# Patient Record
Sex: FEMALE | Race: BLACK OR AFRICAN AMERICAN | NOT HISPANIC OR LATINO | Employment: OTHER | URBAN - METROPOLITAN AREA
[De-identification: names, ages, dates, MRNs, and addresses within clinical notes are randomized per-mention and may not be internally consistent; named-entity substitution may affect disease eponyms.]

---

## 2022-08-20 ENCOUNTER — HOSPITAL ENCOUNTER (EMERGENCY)
Facility: HOSPITAL | Age: 77
Discharge: HOME | End: 2022-08-21
Attending: INTERNAL MEDICINE
Payer: MEDICARE

## 2022-08-20 DIAGNOSIS — R00.2 HEART PALPITATIONS: ICD-10-CM

## 2022-08-20 DIAGNOSIS — R00.2 PALPITATIONS: ICD-10-CM

## 2022-08-20 DIAGNOSIS — R94.6 ABNORMAL THYROID FUNCTION TEST: ICD-10-CM

## 2022-08-20 DIAGNOSIS — I10 PRIMARY HYPERTENSION: Primary | ICD-10-CM

## 2022-08-20 DIAGNOSIS — R00.2 PALPITATION: ICD-10-CM

## 2022-08-20 LAB
ALBUMIN SERPL-MCNC: 4.1 GM/DL (ref 3.4–4.8)
ALBUMIN/GLOB SERPL: 1.1 RATIO (ref 1.1–2)
ALP SERPL-CCNC: 66 UNIT/L (ref 40–150)
ALT SERPL-CCNC: 16 UNIT/L (ref 0–55)
AST SERPL-CCNC: 18 UNIT/L (ref 5–34)
BILIRUBIN DIRECT+TOT PNL SERPL-MCNC: 1.4 MG/DL
BUN SERPL-MCNC: 11 MG/DL (ref 9.8–20.1)
CALCIUM SERPL-MCNC: 10 MG/DL (ref 8.4–10.2)
CHLORIDE SERPL-SCNC: 104 MMOL/L (ref 98–107)
CO2 SERPL-SCNC: 25 MMOL/L (ref 23–31)
CREAT SERPL-MCNC: 0.92 MG/DL (ref 0.55–1.02)
GFR SERPLBLD CREATININE-BSD FMLA CKD-EPI: >60 MLS/MIN/1.73/M2
GLOBULIN SER-MCNC: 3.9 GM/DL (ref 2.4–3.5)
GLUCOSE SERPL-MCNC: 90 MG/DL (ref 82–115)
POTASSIUM SERPL-SCNC: 3.5 MMOL/L (ref 3.5–5.1)
PROT SERPL-MCNC: 8 GM/DL (ref 5.8–7.6)
SODIUM SERPL-SCNC: 141 MMOL/L (ref 136–145)
T4 FREE SERPL-MCNC: 0.85 NG/DL (ref 0.7–1.48)
TSH SERPL-ACNC: 13.72 UIU/ML (ref 0.35–4.94)

## 2022-08-20 PROCEDURE — 84075 ASSAY ALKALINE PHOSPHATASE: CPT | Performed by: INTERNAL MEDICINE

## 2022-08-20 PROCEDURE — 93005 ELECTROCARDIOGRAM TRACING: CPT

## 2022-08-20 PROCEDURE — 84443 ASSAY THYROID STIM HORMONE: CPT | Performed by: INTERNAL MEDICINE

## 2022-08-20 PROCEDURE — 36415 COLL VENOUS BLD VENIPUNCTURE: CPT | Performed by: INTERNAL MEDICINE

## 2022-08-20 PROCEDURE — 84439 ASSAY OF FREE THYROXINE: CPT | Performed by: INTERNAL MEDICINE

## 2022-08-20 PROCEDURE — 80053 COMPREHEN METABOLIC PANEL: CPT | Performed by: INTERNAL MEDICINE

## 2022-08-20 PROCEDURE — 99285 EMERGENCY DEPT VISIT HI MDM: CPT | Mod: 25

## 2022-08-21 VITALS
DIASTOLIC BLOOD PRESSURE: 78 MMHG | OXYGEN SATURATION: 100 % | BODY MASS INDEX: 25.73 KG/M2 | SYSTOLIC BLOOD PRESSURE: 174 MMHG | TEMPERATURE: 99 F | HEIGHT: 67 IN | WEIGHT: 163.94 LBS | HEART RATE: 75 BPM | RESPIRATION RATE: 18 BRPM

## 2022-08-21 NOTE — ED PROVIDER NOTES
"Encounter Date: 8/20/2022       History     Chief Complaint   Patient presents with    Palpitations     Pt arrives with c/o "feeling like my heart is quivering" x a few weeks; denies any pain      Presents with heart palpitations, states intermittently usually at night. No medical problems, not taking any medications. Denies other symptoms    The history is provided by the patient.   Palpitations   This is a recurrent problem. The current episode started several weeks ago. The problem occurs intermittently. The problem has been waxing and waning. Pertinent negatives include no diaphoresis, no fever, no chest pain, no chest pressure, no near-syncope, no orthopnea, no syncope, no abdominal pain, no nausea, no back pain, no lower extremity edema, no weakness, no hemoptysis and no shortness of breath. She has tried nothing for the symptoms. Risk factors include post menopause. Her past medical history does not include anemia, heart disease, hyperthyroidism or valve disorder.     Review of patient's allergies indicates:  No Known Allergies  History reviewed. No pertinent past medical history.  No past surgical history on file.  History reviewed. No pertinent family history.     Review of Systems   Constitutional: Negative for diaphoresis and fever.   HENT: Negative for sore throat.    Respiratory: Negative for hemoptysis and shortness of breath.    Cardiovascular: Positive for palpitations. Negative for chest pain, orthopnea, syncope and near-syncope.   Gastrointestinal: Negative for abdominal pain and nausea.   Genitourinary: Negative for dysuria.   Musculoskeletal: Negative for back pain.   Skin: Negative for rash.   Neurological: Negative for weakness.   Hematological: Does not bruise/bleed easily.   All other systems reviewed and are negative.      Physical Exam     Initial Vitals [08/20/22 2014]   BP Pulse Resp Temp SpO2   (!) 189/87 106 18 98.3 °F (36.8 °C) 99 %      MAP       --         Physical Exam    Nursing " note and vitals reviewed.  Constitutional: She appears well-developed and well-nourished. No distress.   HENT:   Head: Normocephalic and atraumatic.   Mouth/Throat: Oropharynx is clear and moist.   Eyes: Conjunctivae are normal. Pupils are equal, round, and reactive to light.   Neck: Neck supple.   Normal range of motion.  Cardiovascular: Normal rate, regular rhythm, normal heart sounds and intact distal pulses.   Pulmonary/Chest: Breath sounds normal.   Abdominal: Abdomen is soft. Bowel sounds are normal. She exhibits no distension. There is no abdominal tenderness. There is no rebound and no guarding.   Musculoskeletal:         General: No edema. Normal range of motion.      Cervical back: Normal range of motion and neck supple.     Neurological: She is alert and oriented to person, place, and time. She has normal strength. GCS score is 15. GCS eye subscore is 4. GCS verbal subscore is 5. GCS motor subscore is 6.   Skin: Skin is warm and dry. No rash noted.   Psychiatric: Her behavior is normal.         ED Course   Procedures  Labs Reviewed   COMPREHENSIVE METABOLIC PANEL - Abnormal; Notable for the following components:       Result Value    Protein Total 8.0 (*)     Globulin 3.9 (*)     All other components within normal limits   TSH - Abnormal; Notable for the following components:    Thyroid Stimulating Hormone 13.7211 (*)     All other components within normal limits   T4, FREE - Normal   EXTRA TUBES    Narrative:     The following orders were created for panel order EXTRA TUBES.  Procedure                               Abnormality         Status                     ---------                               -----------         ------                     Light Blue Top Hold[[id number]]                                                         Red Top Hold[[id number]]                                                                Lavender Top Hold[[id number]]                                                            Pink Top Hold[[id number]]                                                                 Please view results for these tests on the individual orders.   LIGHT BLUE TOP HOLD   RED TOP HOLD   LAVENDER TOP HOLD   PINK TOP HOLD     EKG Readings: (Independently Interpreted)   Initial Reading: No STEMI. Rhythm: Normal Sinus Rhythm. Heart Rate: 80. Ectopy: No Ectopy. Conduction: Normal. ST Segments: Normal ST Segments. T Waves: Normal. Clinical Impression: Normal Sinus Rhythm       Imaging Results          X-Ray Chest PA And Lateral (In process)               X-Rays:   Independently Interpreted Readings:   Chest X-Ray: Normal heart size.  No infiltrates.  No acute abnormalities. Chronic left elevation of hemidiaphragm     Medications - No data to display                Pt states she check her BP at home often with normal results. Will like to monitor it before medications.       Clinical Impression:   Final diagnoses:  [R00.2] Palpitations  [R00.2] Palpitation  [I10] Primary hypertension (Primary)  [R00.2] Heart palpitations  [R94.6] Abnormal thyroid function test          ED Disposition Condition    Discharge Stable        ED Prescriptions     None        Follow-up Information    None          Rohan Meyer MD  08/20/22 2125

## 2022-10-03 DIAGNOSIS — H26.9 CATARACT, UNSPECIFIED CATARACT TYPE, UNSPECIFIED LATERALITY: Primary | ICD-10-CM

## 2022-12-13 ENCOUNTER — OFFICE VISIT (OUTPATIENT)
Dept: OPHTHALMOLOGY | Facility: CLINIC | Age: 77
End: 2022-12-13
Payer: MEDICARE

## 2022-12-13 VITALS — WEIGHT: 163.81 LBS | HEIGHT: 67 IN | BODY MASS INDEX: 25.71 KG/M2

## 2022-12-13 DIAGNOSIS — H43.12 VITREOUS HEMORRHAGE OF LEFT EYE: Primary | ICD-10-CM

## 2022-12-13 DIAGNOSIS — H25.812 COMBINED FORMS OF AGE-RELATED CATARACT OF LEFT EYE: ICD-10-CM

## 2022-12-13 PROCEDURE — 99212 OFFICE O/P EST SF 10 MIN: CPT | Mod: PBBFAC,PO | Performed by: STUDENT IN AN ORGANIZED HEALTH CARE EDUCATION/TRAINING PROGRAM

## 2022-12-13 PROCEDURE — 92134 CPTRZ OPH DX IMG PST SGM RTA: CPT | Mod: PBBFAC,PO | Performed by: STUDENT IN AN ORGANIZED HEALTH CARE EDUCATION/TRAINING PROGRAM

## 2022-12-13 PROCEDURE — 92136 OPHTHALMIC BIOMETRY: CPT | Mod: PBBFAC,PO | Performed by: STUDENT IN AN ORGANIZED HEALTH CARE EDUCATION/TRAINING PROGRAM

## 2022-12-13 PROCEDURE — 92134 CPTRZ OPH DX IMG PST SGM RTA: CPT | Mod: PBBFAC,PO | Performed by: OPHTHALMOLOGY

## 2022-12-13 PROCEDURE — 92136 OPHTHALMIC BIOMETRY: CPT | Mod: PBBFAC,PO | Performed by: OPHTHALMOLOGY

## 2022-12-13 RX ORDER — B1/B2/B3/B5/B6/IRON/METH/CHOLN 2.5-18/15
15 LIQUID (ML) ORAL
COMMUNITY

## 2022-12-13 NOTE — PROGRESS NOTES
HPI     Cataract     Additional comments: Mac Deg OS           Comments    Referred for cataract evaluation by Dr. Mayes . Patient has a hx of   Neovascular AMD w/ inactive CNV OS          Last edited by Roya Cardoza MA on 12/13/2022  8:24 AM.            OCT Mac   RE good foveal contour no edema   LE unable     Assessment /Plan     For exam results, see Encounter Report.    Combined forms of age-related cataract of left eye  -     Ambulatory referral/consult to Ophthalmology      Combined form of age-related cataract, left eye  - Patient with visually significant cataract and desires surgical removal. The risks/benefits/alternatives of cataract extraction with placement of an intraocular lens were discussed with the patient, including, but not limited to infection, bleeding, loss of vision, loss of the eye, overcorrection, undercorrection, need for further surgery. After the opportunity to ask questions, the patient elected to proceed. A consent document was signed, witnessed, and placed in the patient's chart.  - No improvement with Mrx   - Trauma: none   - Guttae: none  - Phaco/iridodonesis: none  - Trypan blue: no  - Flomax use: no  - Dilation: good  - Anticoagulant/antiplatelet use: none  - Cooperative with exam: yes Pt able to lie flat for 30 minutes, will plan to do under local  - Comorbidities: none  - Medical clearance: per anesthesia   - Patient sent by retina provider for CEIOL. Currently treated with q4wk anti-VEGF for neovascular AMD. Discussed that there is a high likelihood that surgery will not improve vision but will improve the view to the retina for further treatment of retina condition.   - Lens to be used: +22.50 [id number]   - 12/14/22 - Discussed patient with staff member at Dr. Boogie's office who confirmed that the vitreous hemorrhage was present at her last visit and that cataract extraction is requested in order to improve the view for further treatment of her retinal condition. Called  the patient to discuss and will plan for CEIOL OS on 12/20/22 with Dr. Pierce         Neovascular AMD OS   - follows with outside retina receiving q4wk Anti-VEGF             RTC postop

## 2022-12-14 PROBLEM — H43.12 VITREOUS HEMORRHAGE OF LEFT EYE: Status: ACTIVE | Noted: 2022-12-14

## 2022-12-14 PROBLEM — H25.812 COMBINED FORMS OF AGE-RELATED CATARACT OF LEFT EYE: Status: ACTIVE | Noted: 2022-12-14

## 2022-12-14 RX ORDER — PHENYLEPHRINE HYDROCHLORIDE 25 MG/ML
1 SOLUTION/ DROPS OPHTHALMIC
Status: CANCELLED | OUTPATIENT
Start: 2022-12-20

## 2022-12-14 RX ORDER — SODIUM CHLORIDE 0.9 % (FLUSH) 0.9 %
10 SYRINGE (ML) INJECTION
Status: SHIPPED | OUTPATIENT
Start: 2022-12-14

## 2022-12-14 RX ORDER — TROPICAMIDE 10 MG/ML
1 SOLUTION/ DROPS OPHTHALMIC
Status: CANCELLED | OUTPATIENT
Start: 2022-12-20

## 2022-12-14 RX ORDER — PROPARACAINE HYDROCHLORIDE 5 MG/ML
1 SOLUTION/ DROPS OPHTHALMIC
Status: CANCELLED | OUTPATIENT
Start: 2022-12-20

## 2022-12-14 RX ORDER — CYCLOPENTOLATE HYDROCHLORIDE 10 MG/ML
1 SOLUTION/ DROPS OPHTHALMIC
Status: CANCELLED | OUTPATIENT
Start: 2022-12-20

## 2022-12-19 ENCOUNTER — TELEPHONE (OUTPATIENT)
Dept: OPHTHALMOLOGY | Facility: CLINIC | Age: 77
End: 2022-12-19
Payer: MEDICARE

## 2022-12-19 NOTE — TELEPHONE ENCOUNTER
Per Dr. Preaza, pt's surgery was cancelled bc of thyroid levels. He states that surgery should have called the pt to let her know what she needs to do in order to be rescheduled. Called pt who states nobody called her to let her know that her surgery was cancelled. Gave pt phone number to surgery and told pt that if they cannot answer her question to call back and ask for me and that I will personally take responsibility to find out what she needs to do in order to r/s surgery. Voiced understanding.

## 2022-12-19 NOTE — TELEPHONE ENCOUNTER
----- Message from Wilda Rodriguez sent at 12/19/2022  3:22 PM CST -----  Regarding: questions  Pt has questions about surgery been canceled     [phone]

## 2022-12-30 ENCOUNTER — TELEPHONE (OUTPATIENT)
Dept: OPHTHALMOLOGY | Facility: CLINIC | Age: 77
End: 2022-12-30
Payer: MEDICARE

## 2022-12-30 NOTE — TELEPHONE ENCOUNTER
Patient was scheduled for eye surgery 12/20/22, surgery was cancelled due to elevated TSH, call placed to PCP Marisel Ryan NP patient was scheduled for follow up to address labs on 1/4/23 at 240pm, patient notified ansley agrees to the appt. Date and time, will notify this office once labs addressed to reschedule surgery.

## 2024-02-14 ENCOUNTER — OFFICE VISIT (OUTPATIENT)
Dept: OPHTHALMOLOGY | Facility: CLINIC | Age: 79
End: 2024-02-14
Payer: MEDICARE

## 2024-02-14 VITALS — WEIGHT: 163 LBS | HEIGHT: 67 IN | BODY MASS INDEX: 25.58 KG/M2

## 2024-02-14 DIAGNOSIS — H35.053 CNVM (CHOROIDAL NEOVASCULAR MEMBRANE), BILATERAL: ICD-10-CM

## 2024-02-14 DIAGNOSIS — H25.812 COMBINED FORMS OF AGE-RELATED CATARACT OF LEFT EYE: Primary | ICD-10-CM

## 2024-02-14 DIAGNOSIS — H35.3223 EXUDATIVE AGE-RELATED MACULAR DEGENERATION OF LEFT EYE WITH INACTIVE SCAR: ICD-10-CM

## 2024-02-14 PROCEDURE — 92250 FUNDUS PHOTOGRAPHY W/I&R: CPT | Mod: PBBFAC,PN,59 | Performed by: OPHTHALMOLOGY

## 2024-02-14 PROCEDURE — 99213 OFFICE O/P EST LOW 20 MIN: CPT | Mod: PBBFAC,PN,25 | Performed by: STUDENT IN AN ORGANIZED HEALTH CARE EDUCATION/TRAINING PROGRAM

## 2024-02-14 PROCEDURE — 92134 CPTRZ OPH DX IMG PST SGM RTA: CPT | Mod: PBBFAC,PN | Performed by: STUDENT IN AN ORGANIZED HEALTH CARE EDUCATION/TRAINING PROGRAM

## 2024-02-14 PROCEDURE — 92134 CPTRZ OPH DX IMG PST SGM RTA: CPT | Mod: PBBFAC,PN | Performed by: OPHTHALMOLOGY

## 2024-02-14 RX ORDER — LEVOTHYROXINE SODIUM 50 UG/1
50 TABLET ORAL EVERY MORNING
COMMUNITY
Start: 2024-01-09

## 2024-02-14 NOTE — PROGRESS NOTES
HPI     Vitreous hemorrhage of left eye     Additional comments: Patient was scheduled for CEIOL with Dr. Pierce on   12/20/22, but sx was cancelled DT elevated TSH. Pt has met with PCP and   TSH still elevated and T4 level low. Pt was advised she is to began meds   and repeat labs in 6 wks.            Last edited by Tim Coughlin LPN on 2/14/2024  2:23 PM.            Assessment /Plan     For exam results, see Encounter Report.    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  No FC.  No fluid      Cataracts, OU  - was on books for CEIOL OS 2022 (cancelled for clearance) but on review today the macular scar from CNV is dictating BCVA.  Cataracts are symmetric and right eye sees 20/20- w/o correction  - Long discussion had about RBA of CEIOL; we will monitor      CNV (Neovascular AMD OS?)  ERM, OS  - +APD OS  - OCT shows atrophic retinal layers and scarring in central axis with ERM  - follows with outside retina receiving q4wk Anti-VEGF in 2022  - states she hasn't had injection since 2022; no fluid  - right eye has trace SRF near nerve barely caught on OCTm with associated fine heme on exam.  - OPTOS taken 2/14/24  -She is to continue following with outside retina MD (saw several weeks ago)    RTC PRN

## 2024-12-10 ENCOUNTER — CLINICAL SUPPORT (OUTPATIENT)
Dept: AUDIOLOGY | Facility: HOSPITAL | Age: 79
End: 2024-12-10
Payer: MEDICARE

## 2024-12-10 DIAGNOSIS — H90.3 SENSORINEURAL HEARING LOSS (SNHL) OF BOTH EARS: Primary | ICD-10-CM

## 2024-12-10 PROCEDURE — 92567 TYMPANOMETRY: CPT | Performed by: AUDIOLOGIST-HEARING AID FITTER

## 2024-12-10 PROCEDURE — 92557 COMPREHENSIVE HEARING TEST: CPT | Performed by: AUDIOLOGIST-HEARING AID FITTER

## 2024-12-10 NOTE — PROGRESS NOTES
Audiological Evaluation    Patient History:    Patient evaluated today to assess hearing.  She reportedly perceives some difficulties with hearing/understanding speech at the present time. Otalgia, otorrhea and a history of middle ear involvement/otologic procedures have been denied at this time, however occasional tinnitus has been reported.  Patient's medical history has reportedly not changed since most recent medical evaluation.       Pure Tone Testing:    Right ear:       Mild to moderate, SNHL    Left ear:          Mild to moderate, SNHL        Tympanometry:      Right ear:   Type 'As' tympanogram (0.24 mL)    Left ear: Type 'A' tympanogram           Acoustic Reflex Testing    Right ear:   Did not test     Left ear: Did not test        Interpretations:    Pure tone testing revealed a mild to moderate, sensorineural hearing loss, bilaterally. Speech reception thresholds were obtained at 35 dB HL (right ear) and 25 dB HL (left ear) consistent with pure tone results, bilaterally.  Word recognition scores were excellent, bilaterally.  Immittance testing revealed a Type As tympanogram for the right ear indicative of slightly, reduced TM mobility; a Type A tympanogram was noted for the left ear indicative of normal middle ear function. Otoscopy revealed clear EACs, bilaterally.      Recommendations:     Audiological testing annually  ENT evaluation per ENT  Hearing protection when exposed to hazardous noise    Aydee Price.  Clinical Audiologist